# Patient Record
(demographics unavailable — no encounter records)

---

## 2025-01-27 NOTE — HISTORY OF PRESENT ILLNESS
[FreeTextEntry1] : Pt presenting today for a f/u visit joint pain, OP. Last seen 09/2024. Chart reviewed with pt.   No acute complaints today. Currently reports feeling well.  No recent falls. No fx. Taking calcium/vitamin d supplements Had extensive discussion with pt today about treatment options. At this time the decision was made to c/w oral bisphosphonates.  ROS otherwise unchanged from LV.

## 2025-01-27 NOTE — REASON FOR VISIT
[Follow-Up: _____] : a [unfilled] follow-up visit [Language Line ] : provided by Language Line   [Interpreters_IDNumber] : [id number] [Interpreters_FullName] : Sulma [TWNoteComboBox1] : Romanian Length To Time In Minutes Device Was In Place: 10

## 2025-01-27 NOTE — ASSESSMENT
[FreeTextEntry1] : 1. Polyarthralgia's, polyarthritis, fatigue. Previously following rheumatology for MCTD/ RA Previously treated with HCQ- stopped as reports no improvement in symptoms 2. mediastinal mass- being monitored. 3. Axonal peripheral neuropathy. Following neurology. Being treated with gabapentin 300 mg TID 4. Chronic LBP from DDD and disc protrusion 5. +APS Ab. Previously seen by heme and recommend ASA 81 6. Osteopenia with focal OP  - repeat labs - NSAIDS/Tylenol prn for pain - OTC topical analgesics - Had extensive discussion with pt today about treatment options. At this time the decision was made to c/w alendronate weekly. repeat DEXA 12/2025 - encouraged compliance with f/u visits - calcium and vitamin d supplementation.  - regular exercise: aerobic and resistance - fall prevention  Discussed treatment plan with the patient. The patient was given the opportunity to ask questions and all questions were answered to their satisfaction.

## 2025-02-24 NOTE — DATA REVIEWED
[FreeTextEntry1] :   IMPRESSION:   L4-L5: Mild disc bulge with small superimposed central protrusion. Mild narrowing of the left lateral recess. No significant spinal canal stenosis. Mild left greater than right foraminal narrowing. Mildly progressed.  Transitional partially sacralized L5 with pseudoarticulation with the sacrum on the right.   	Goal	[id number] Item Name		10:36AM	11:15AM	02:28PM	12:49PM	12:23PM	10:50AM WES[facility]		23	14	11	40	36	28  Goal	[id number] Item Name		10:36AM	11:15AM	02:28PM	12:49PM	10:50AM CPK		216	222	216	178	185

## 2025-02-24 NOTE — REVIEW OF SYSTEMS
[Arthralgias] : arthralgias [Joint Stiffness] : joint stiffness [Lower Back Pain] : lower back pain [Limb Pain] : limb pain [Negative] : Heme/Lymph

## 2025-02-24 NOTE — HISTORY OF PRESENT ILLNESS
[FreeTextEntry1] : 51-year-old woman with a history of mixed connective tissue disease, chronic back pain patient on the left, history of hypertension, hyperlipidemia, with a history of chronic numbness, physical weakness of the left leg, always feels like it numbness, actively squeezing the left foot and ankle. 2024, which shows degenerative changes, especially at this protrusion at the L4-L5, with slight narrowing of the foramina more on the left than the right. Electrodiagnostic study performed in August 2024 of the legs showed evidence of a chronic not active L5-S1 radiculopathy. Presently on gabapentin which she takes 300 mg 3 times a day which is has been helpful, helps her to be more active, she feels the need to use a cane for support.  Denies similar problems on the opposite leg, no arm, no neck pain. She did see some benefit with physical therapy, and would like to return.

## 2025-02-24 NOTE — PHYSICAL EXAM
[General Appearance - Alert] : alert [General Appearance - In No Acute Distress] : in no acute distress [General Appearance - Well Nourished] : well nourished [General Appearance - Well Developed] : well developed [General Appearance - Well-Appearing] : healthy appearing [Oriented To Time, Place, And Person] : oriented to person, place, and time [Impaired Insight] : insight and judgment were intact [Affect] : the affect was normal [Person] : oriented to person [Place] : oriented to place [Time] : oriented to time [Cranial Nerves Optic (II)] : visual acuity intact bilaterally,  visual fields full to confrontation, pupils equal round and reactive to light [Cranial Nerves Oculomotor (III)] : extraocular motion intact [Cranial Nerves Trigeminal (V)] : facial sensation intact symmetrically [Cranial Nerves Facial (VII)] : face symmetrical [Cranial Nerves Vestibulocochlear (VIII)] : hearing was intact bilaterally [Cranial Nerves Glossopharyngeal (IX)] : tongue and palate midline [Cranial Nerves Accessory (XI - Cranial And Spinal)] : head turning and shoulder shrug symmetric [Cranial Nerves Hypoglossal (XII)] : there was no tongue deviation with protrusion [Motor Tone] : muscle tone was normal in all four extremities [Motor Handedness Right-Handed] : the patient is right hand dominant [Tactile Decrease Entire Leg Right] : diminished right leg [Tactile Decrease Entire Leg Left] : diminished left leg [Pain / Temp Decrease Entire Leg - Right] : diminished right leg [Pain / Temp Decrease Entire Leg - Left] : diminished left leg [Pain / Temp Decrease Lower Medial Thigh & Knee Left Only] : diminished left lower medial thigh and knee [] : diminished left web space between the 1st and 2nd toes [Past-pointing] : there was no past-pointing [Tremor] : no tremor present [Dysdiadochokinesia Bilaterally] : not present [Coordination - Dysmetria Impaired Finger-to-Nose Bilateral] : not present [2+] : Ankle jerk right 2+ [0] : Ankle jerk left 0 [Plantar Reflex Right Only] : normal on the right [Plantar Reflex Left Only] : normal on the left [___] : absent on the right [___] : absent on the left [FreeTextEntry6] : Mild weakness 5-/5, on dorsiflexion, inversion, eversion of the left foot compared to the right. [FreeTextEntry7] : reduced distally to pinprick and soft touch [FreeTextEntry8] : walk with a cane supporting the left

## 2025-02-24 NOTE — ASSESSMENT
[FreeTextEntry1] : 51-year-old right-handed woman history of recurrent of tissue disease, chronic back pain, especially with left leg weakness, mild findings on examination.  Electrodiagnostic study consistent with a chronic L5-S1 radiculopathy on the left.  Imaging showed a degenerative disc disease at the L4-5 with lateral foraminal narrowing more on the left than the right. Reviewed and discussed options. Will refer to physical therapy. Will continue gabapentin 300 mg 3 times a day. Discussed the possible use of surgical technique, recommended referral to neurosurgery perhaps a limited intervention might be helpful but at this time she is prefer rather not. Return to office, 6 to 9 months.

## 2025-05-12 NOTE — HISTORY OF PRESENT ILLNESS
[FreeTextEntry1] : Ms. 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 At our last visit in March 2024, it was recommended she obtain CT Surveillance imaging in 1 year which she presents today to review.   Past medical history includes hypertension, hypercholesterolemia, peripheral neuropathy, and anterior mediastinal mass.  3/9/24 CT Chest non contrast at [facility] -Stable appearance thymic tissue anterior mediastinum.  04/26/25: CT of the chest from [facility]  Stable exam. Unchanged scattered regions of groundglass and scarring in the lungs. No areas of new or worsening. Stable calcified granuloma.

## 2025-05-12 NOTE — HISTORY OF PRESENT ILLNESS
[FreeTextEntry1] : Ms. 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 At our last visit in March 2024, it was recommended she obtain CT Surveillance imaging in 1 year which she presents today to review.   Past medical history includes hypertension, hypercholesterolemia, peripheral neuropathy, and anterior mediastinal mass.  3/9/24 CT Chest non contrast at [facility] -Stable appearance thymic tissue anterior mediastinum.  04/26/25: CT of the chest from [facility]  Stable exam. Unchanged scattered regions of groundglass and scarring in the lungs. No areas of new or worsening. Stable calcified granuloma.

## 2025-05-12 NOTE — DATA REVIEWED
[FreeTextEntry1] : Independent review of imaging and independent interpretation was performed at today's visit. CT of the chest from 04/26/25

## 2025-05-12 NOTE — REASON FOR VISIT
[Home] : at home, [unfilled] , at the time of the visit. [Medical Office: ([facility])___] : at the medical office located in  [This encounter was initiated by telehealth (audio with video) and converted to telephone (audio only)] : This encounter was initiated by telehealth (audio with video) and converted to telephone (audio only) [Verbal consent obtained from patient] : the patient, [unfilled]

## 2025-05-12 NOTE — ASSESSMENT
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  Her recent imaging study demonstrates no change and I would like her to continue surveillance with a CT in 1 years time.  Thank you for allow me to participate in the care of your patient.  30 minutes was spent during this encounter.  Tomi Pelletier MD Department of Cardiovascular and Thoracic Surgery  [employer] [facility]

## 2025-05-12 NOTE — REASON FOR VISIT
[Home] : at home, [unfilled] , at the time of the visit. [Medical Office: ([facility])___] : at the medical office located in  [This encounter was initiated by telehealth (audio with video) and converted to telephone (audio only)] : This encounter was initiated by telehealth (audio with video) and converted to telephone (audio only) [Verbal consent obtained from patient] : the patient, [unfilled]

## 2025-05-12 NOTE — ASSESSMENT
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  Her recent imaging study demonstrates no change and I would like her to continue surveillance with a CT in 1 years time.  Thank you for allow me to participate in the care of your patient.  30 minutes was spent during this encounter.  Tomi Pelletier MD Department of Cardiovascular and Thoracic Surgery  [employer] at [facility]

## 2025-07-11 NOTE — HISTORY OF PRESENT ILLNESS
[FreeTextEntry1] : Pt presenting today for a f/u visit joint pain, OP. Last seen 01/2025. Chart reviewed with pt.   No acute complaints today. Currently reports feeling well.  No recent falls. No fx. Taking calcium/vitamin d supplements Had extensive discussion with pt today about treatment options. At this time the decision was made to c/w oral bisphosphonates. Repeat DEXA 12/2025 ROS otherwise unchanged from LV.

## 2025-07-11 NOTE — REASON FOR VISIT
[Follow-Up: _____] : a [unfilled] follow-up visit [Language Line ] : provided by Language Line   [Interpreters_IDNumber] : [id number] [Interpreters_FullName] : Sulma [TWNoteComboBox1] : [place]
